# Patient Record
Sex: MALE | Race: WHITE | NOT HISPANIC OR LATINO | Employment: STUDENT | URBAN - METROPOLITAN AREA
[De-identification: names, ages, dates, MRNs, and addresses within clinical notes are randomized per-mention and may not be internally consistent; named-entity substitution may affect disease eponyms.]

---

## 2023-01-31 ENCOUNTER — NURSE TRIAGE (OUTPATIENT)
Dept: NURSING | Facility: CLINIC | Age: 23
End: 2023-01-31

## 2023-01-31 ENCOUNTER — HOSPITAL ENCOUNTER (EMERGENCY)
Facility: CLINIC | Age: 23
Discharge: HOME | End: 2023-01-31
Attending: EMERGENCY MEDICINE | Admitting: EMERGENCY MEDICINE
Payer: COMMERCIAL

## 2023-01-31 VITALS
WEIGHT: 150 LBS | RESPIRATION RATE: 15 BRPM | OXYGEN SATURATION: 98 % | SYSTOLIC BLOOD PRESSURE: 150 MMHG | TEMPERATURE: 98.1 F | HEART RATE: 81 BPM | BODY MASS INDEX: 22.73 KG/M2 | HEIGHT: 68 IN | DIASTOLIC BLOOD PRESSURE: 80 MMHG

## 2023-01-31 DIAGNOSIS — B27.90 AMPICILLIN-INDUCED RASH IN PATIENT WITH INFECTIOUS MONONUCLEOSIS: ICD-10-CM

## 2023-01-31 DIAGNOSIS — T36.0X5A AMPICILLIN-INDUCED RASH IN PATIENT WITH INFECTIOUS MONONUCLEOSIS: ICD-10-CM

## 2023-01-31 DIAGNOSIS — L27.0 AMPICILLIN-INDUCED RASH IN PATIENT WITH INFECTIOUS MONONUCLEOSIS: ICD-10-CM

## 2023-01-31 PROCEDURE — 99284 EMERGENCY DEPT VISIT MOD MDM: CPT | Performed by: EMERGENCY MEDICINE

## 2023-01-31 PROCEDURE — 99283 EMERGENCY DEPT VISIT LOW MDM: CPT | Performed by: EMERGENCY MEDICINE

## 2023-01-31 RX ORDER — CETIRIZINE HYDROCHLORIDE 10 MG/1
10 TABLET ORAL DAILY
Qty: 20 TABLET | Refills: 0 | Status: SHIPPED | OUTPATIENT
Start: 2023-01-31

## 2023-01-31 RX ORDER — PREDNISONE 20 MG/1
TABLET ORAL
Qty: 10 TABLET | Refills: 0 | Status: SHIPPED | OUTPATIENT
Start: 2023-01-31

## 2023-01-31 NOTE — TELEPHONE ENCOUNTER
[facility] Triage SBAR    Situation: New rash    Background: Patient calling. Sinus infection - Amoxicillin - symptoms getting worse. Friday went into the clinic again - Diagnoses with Mono. Symptoms started to get better until last night.     Assessment: Rashes breaking out. Pink bumps. Widespread. Itchy. Sore throat - able to swallow - improvement in pain - 2-3/10 pain. Nasal congestion. Swollen lymph nodes. Temp: 99.5.     Protocol Recommended Disposition: Emergency Department (Or PCP Triage)    Recommendation: According to the protocol, Patient should go to the ED now (Or PCP Triage). Advised Patient to go to the ED now (Or PCP Triage). Care advice given. Patient verbalizes understanding and agrees with plan of care.       Paulina Danielson RN Nursing Advisor 1/31/2023 6:29 PM     Reason for Disposition    Fever    Additional Information    Negative: SEVERE THROAT PAIN WITH DYSPHAGIA (e.g., can't swallow any liquids, drooling)    Negative: Shock suspected (e.g., cold/pale/clammy skin, too weak to stand, low BP, rapid pulse)    Negative: SEVERE difficulty breathing (e.g., struggling for each breath, speaks in single words, stridor)    Negative: Difficult to awaken or acting confused (e.g., disoriented, slurred speech)    Negative: Passed out (i.e., lost consciousness, collapsed and was not responding)    Negative: Sounds like a life-threatening emergency to the triager    Negative: SEVERE abdominal pain    Negative: Stiff neck (can't touch chin to chest)    Negative: Unable to open mouth completely    Negative: [1] SEVERE headache (e.g., excruciating) AND [2] not improved after 2 hours of pain medicine    Negative: [1] MILD-MODERATE abdominal pain AND [2] constant AND [3] present > 2 hours    Negative: [1] Difficulty breathing AND [2] not severe    Negative: [1] Refuses to drink anything AND [2] for > 12 hours    Negative: [1] Drinking very little AND [2] dehydration suspected (e.g., no urine > 12 hours, very dry mouth,  very lightheaded)    Negative: [1] New-onset shoulder or upper back pain AND [2] on left side only    Negative: [1] New-onset pale skin AND [2] much more pale than normal (major change)    Negative: Patient sounds very sick or weak to triager    Negative: Sounds like a serious complication to the triager    Negative: Fever > 104 F (40 C)    Negative: Blood-colored or deep red dots on skin    Negative: [1] New-onset pale skin AND [2] more pale than normal    Negative: [1] Fever higher AND [2] caller can't be reassured    Negative: Yellow eyes (jaundice)    Negative: [1] Life-threatening reaction (anaphylaxis) in the past to similar substance (e.g., food, insect bite/sting, chemical, etc.) AND [2] < 2 hours since exposure    Negative: [1] Sudden onset of rash (within last 2 hours) AND [2] difficulty breathing or swallowing    Negative: Shock suspected (e.g., cold/pale/clammy skin, too weak to stand, low BP, rapid pulse)    Negative: Difficult to awaken or acting confused (e.g., disoriented, slurred speech)    Negative: [1] Purple or blood-colored spots or dots AND [2] fever    Negative: Sounds like a life-threatening emergency to the triager    Negative: Insect bites suspected    Negative: Swimmer's Itch suspected    Negative: Sunburn suspected    Negative: Hives suspected    Negative: Measles suspected AND [2] known exposure to measles in past 3 weeks    Negative: [1] Chickenpox suspected AND [2] known exposure to chickenpox in past 3 weeks    Negative: [1] Drug rash suspected AND [2] started taking new medicine within last 2 weeks (Exception: antihistamine, eye drops, ear drops, decongestant or other OTC cough/cold medicines)    Negative: [1] Bright red, sunburn-like rash AND [2] current tampon use or nasal packing    Negative: [1] Bright red, sunburn-like rash AND [3] wound infection or recent surgery    Negative: [1] Bright red skin AND [2] peels off in sheets    Negative: Stiff neck (can't touch chin to chest)     Negative: [1] Fever returns after gone for over 24 hours AND [2] symptoms worse or not improved    Negative: Triager concerned about patient's response to recommended treatment plan    Negative: [1] Caller has URGENT question AND [2] triager unable to answer question    Negative: [1] SEVERE throat pain (e.g., excruciating, interferes with normal activities) AND [2] not improved after 2 hours of pain medicine AND [3] drinking adequately    Negative: [1] Caller has NON-URGENT question AND [2] triager unable to answer question    Negative: [1] After 1 week AND [2] fever persists    Negative: [1] After 2 weeks AND [2] not getting better (including not back to school or work)    Negative: [1] After 4 weeks AND [2] not fully recovered    Negative: [1] After 4 weeks AND [2] wants to return to sports or vigorous exercise (e.g., running, weight-lifting)    Protocols used: RASH OR REDNESS - WIDESPREAD-A-, MONONUCLEOSIS FOLLOW-UP CALL-A-

## 2023-02-01 NOTE — ED TRIAGE NOTES
22 yr old male arrives via walk in w/ complaints of hives that started one day ago; hives are located throughout body on bilateral upper arms, torso, and face. Pt is on amoxicillin for mono at this time; pt was diagnosed a week ago. No other pmhx. Pt denies any throat swelling or itching.

## 2023-02-01 NOTE — ED PROVIDER NOTES
History     Chief Complaint   Patient presents with     Hives     HPI  Jewel Mcguire is a 22 year old otherwise healthy male who presents to the emergency department with a chief complaint of rash.  This started yesterday.  Initially, it was located on his upper torso and arms, but has since spread throughout his entire torso, worsened on his arms, spread to his legs, and extended to his posterior neck.  No face or mucous membrane involvement at this time.  No palm/sole involvement.  The rash is mildly pruritic, it is not painful.  No bleeding or drainage.  No associated fever.    The patient was recently started on amoxicillin last week for a suspected sinus infection.  The patient states that he has had about 2 weeks of upper respiratory infection symptoms.  When these failed to improve on the amoxicillin, he was seen in person on Friday at [facility] and was diagnosed with mononucleosis.  He stopped amoxicillin at this time.  To his knowledge, COVID testing was negative at that time as well.  His only current medications are Tylenol/ibuprofen as needed for his monitor symptoms.    The patient denies any other new exposures.  He denies any known sick contacts, he has had no recent travel.  No new foods, no other new medications, no new body products or detergents.  No new plant/animal exposures or insect bites.    The patient denies any difficulty breathing or new facial/airway swelling.  He states he has had some tonsillar swelling due to his monoinfection, but this has not worsened.    Patient's records were reviewed, we are unable to see [facility]'s records.  Most recent testing visible in our system is Covid/flu/strep testing from December 2021, which were all negative.    The patient has no known drug allergies    Per [facility], the patient is vaccinated against COVID-19.    I have reviewed the Medications, Allergies, Past Medical and Surgical History, and Social History in the Epic system.    No past medical  "history on file.  No past surgical history on file.  No current facility-administered medications for this encounter.     No current outpatient medications on file.     No Known Allergies  Past medical history, past surgical history, medications, and allergies were reviewed with the patient. Additional pertinent items: None    Social History     Socioeconomic History     Marital status: Not on file     Spouse name: Not on file     Number of children: Not on file     Years of education: Not on file     Highest education level: Not on file   Occupational History     Not on file   Tobacco Use     Smoking status: Not on file     Smokeless tobacco: Not on file   Substance and Sexual Activity     Alcohol use: Not on file     Drug use: Not on file     Sexual activity: Not on file   Other Topics Concern     Not on file   Social History Narrative     Not on file     Social Determinants of Health     Financial Resource Strain: Not on file   Food Insecurity: Not on file   Transportation Needs: Not on file   Physical Activity: Not on file   Stress: Not on file   Social Connections: Not on file   Intimate Partner Violence: Not on file   Housing Stability: Not on file     Social history was reviewed with the patient. Additional pertinent items: None    Review of Systems  A medically appropriate review of systems was performed with pertinent positives and negatives noted in the HPI, and all other systems negative.    Physical Exam   BP: (!) 150/80  Pulse: 81  Temp: 98.1  F (36.7  C)  Resp: 15  Height: 172.7 cm (5' 8\")  Weight: 68 kg (150 lb)  SpO2: 98 %      General: Well nourished, well developed, NAD  HEENT: EOMI, anicteric. NCAT, MMM  Neck: no jugular venous distension, supple, nl ROM  Cardiac: Regular rate and rhythm. No murmurs, rubs, or gallops. Normal S1, S2.  Intact peripheral pulses  Pulm: CTAB, no stridor, wheezes, rales, rhonchi  Skin: Warm and dry to the touch.  Maculopapular rash present diffusely over patient's torso " and extremities as well as extending up his neck and behind his ears  Extremities: No LE edema, no cyanosis, w/w/p, see skin exam above  Neuro: A&Ox3, no gross focal deficits    ED Course        Procedures                           Labs Ordered and Resulted from Time of ED Arrival to Time of ED Departure - No data to display         No results found for this or any previous visit (from the past 24 hour(s)).    Labs, vital signs, and imaging studies were reviewed by me.    Medications - No data to display    Assessments & Plan (with Medical Decision Making)   Jewel Mcguire is a 22 year old male who presents with rash.  Given history, most likely amoxicillin related rash in the setting of known monoinfection.  Differential diagnosis would also include allergic reaction, other viral exanthem.  No difficulty breathing or airway compromise on exam.    Medical Decision Making  The patient presented with a problem that is an acute illness with systemic symptoms.    The patient's evaluation involved:  review of 3+ test result(s) ordered prior to this encounter (see separate area of note for details)    The patient's management involved prescription drug management (including medications given in the ED).    I have reviewed the nursing notes.    I have reviewed the findings, diagnosis, plan and need for follow up with the patient.    Patient to be discharged home. Advised to follow up with PCP within 1 week. To return to ER immediately with any new/worsening symptoms, particularly any difficulty breathing or facial/airway swelling. Plan of care discussed with patient who expresses understanding and agrees with plan of care.    Patient provided with prescriptions for symptomatic relief at home.      New Prescriptions    CETIRIZINE (ZYRTEC) 10 MG TABLET    Take 1 tablet (10 mg) by mouth daily    PREDNISONE (DELTASONE) 20 MG TABLET    Take two tablets (= 40mg) each day for 5 (five) days       Final diagnoses:    Ampicillin-induced rash in patient with infectious mononucleosis       Kristyn Jung MD  1/31/2023   [facility] EMERGENCY DEPARTMENT     Kristyn Jung MD  01/31/23 2011

## 2023-02-01 NOTE — DISCHARGE INSTRUCTIONS
TODAY'S VISIT:  You were seen today for rash  -   - If you had any labs or imaging/radiology tests performed today, you should also discuss these tests with your usual provider.     FOLLOW-UP:  Please make an appointment to follow up with:  - Your Primary Care Provider. If you do not have a PCP, please call the Primary Care Center (phone: [phone] for an appointment    - Have your provider review the results from today's visit with you again to make sure no further follow-up or additional testing is needed based on those results.     PRESCRIPTIONS / MEDICATIONS:  - prednisone  - zyrtec    RETURN TO THE EMERGENCY DEPARTMENT  Return to the Emergency Department at any time for any new or worsening symptoms or any concerns.

## 2023-05-21 ENCOUNTER — HEALTH MAINTENANCE LETTER (OUTPATIENT)
Age: 23
End: 2023-05-21

## 2024-07-28 ENCOUNTER — HEALTH MAINTENANCE LETTER (OUTPATIENT)
Age: 24
End: 2024-07-28

## 2025-08-10 ENCOUNTER — HEALTH MAINTENANCE LETTER (OUTPATIENT)
Age: 25
End: 2025-08-10